# Patient Record
Sex: FEMALE | Race: WHITE | NOT HISPANIC OR LATINO
[De-identification: names, ages, dates, MRNs, and addresses within clinical notes are randomized per-mention and may not be internally consistent; named-entity substitution may affect disease eponyms.]

---

## 2022-06-06 PROBLEM — Z00.00 ENCOUNTER FOR PREVENTIVE HEALTH EXAMINATION: Status: ACTIVE | Noted: 2022-06-06

## 2022-06-07 ENCOUNTER — APPOINTMENT (OUTPATIENT)
Dept: ORTHOPEDIC SURGERY | Facility: CLINIC | Age: 57
End: 2022-06-07
Payer: COMMERCIAL

## 2022-06-07 VITALS — HEIGHT: 64 IN | WEIGHT: 155 LBS | BODY MASS INDEX: 26.46 KG/M2

## 2022-06-07 DIAGNOSIS — Z78.9 OTHER SPECIFIED HEALTH STATUS: ICD-10-CM

## 2022-06-07 DIAGNOSIS — M70.61 TROCHANTERIC BURSITIS, RIGHT HIP: ICD-10-CM

## 2022-06-07 DIAGNOSIS — M16.11 UNILATERAL PRIMARY OSTEOARTHRITIS, RIGHT HIP: ICD-10-CM

## 2022-06-07 DIAGNOSIS — I10 ESSENTIAL (PRIMARY) HYPERTENSION: ICD-10-CM

## 2022-06-07 DIAGNOSIS — Z87.442 PERSONAL HISTORY OF URINARY CALCULI: ICD-10-CM

## 2022-06-07 PROCEDURE — 99203 OFFICE O/P NEW LOW 30 MIN: CPT

## 2022-06-07 RX ORDER — HYDROCHLOROTHIAZIDE 25 MG/1
25 TABLET ORAL
Refills: 0 | Status: ACTIVE | COMMUNITY

## 2022-06-07 RX ORDER — METHYLPREDNISOLONE 4 MG/1
4 TABLET ORAL
Qty: 1 | Refills: 1 | Status: ACTIVE | COMMUNITY
Start: 2022-06-07 | End: 1900-01-01

## 2022-06-07 RX ORDER — AMLODIPINE BESYLATE 2.5 MG/1
2.5 TABLET ORAL
Refills: 0 | Status: ACTIVE | COMMUNITY

## 2022-06-07 RX ORDER — AMLODIPINE BESYLATE 5 MG/1
5 TABLET ORAL
Refills: 0 | Status: ACTIVE | COMMUNITY

## 2022-06-07 NOTE — ASSESSMENT
[FreeTextEntry1] : Patient was given Rx for PT at this time.\par \par A Medrol dose Rafat and refill was prescribed today.  Patient understands that while taking the Medrol, they shouldn't be taking any other anti-inflammatories.  They may take nsaids in between the two packs.  Pt understands and all questions were answered.\par \par f/u 6 weeks

## 2022-06-07 NOTE — HISTORY OF PRESENT ILLNESS
[de-identified] : Patient has reported 70% pain improvement since going to  [facility]. Still taking advil prn. Say Dr. Barragan 10 years ago, diagnosed with questionable hip bursitis, slight arthritis and hip deformity. Was given meloxicam lost 35 pounds and pain improved. Reports that this pain is different than this pain. Doppler at  was negative. CRP active .5 WBC 6.34 ESR 32. No history of back issues reported. has numbness in right lower back not legs.\par

## 2022-06-07 NOTE — PHYSICAL EXAM
[NL (120)] : flexion 120 degrees [NL (45)] : internal rotation 45 degrees [5___] : adduction 5[unfilled]/5 [Right] : right hip with pelvis [AP] : anteroposterior [Lateral] : lateral [] : no palpable masses [FreeTextEntry8] : mostly gt [FreeTextEntry9] : mild djd

## 2022-06-22 ENCOUNTER — NON-APPOINTMENT (OUTPATIENT)
Age: 57
End: 2022-06-22

## 2022-07-11 ENCOUNTER — APPOINTMENT (OUTPATIENT)
Dept: ORTHOPEDIC SURGERY | Facility: CLINIC | Age: 57
End: 2022-07-11

## 2024-09-06 ENCOUNTER — APPOINTMENT (OUTPATIENT)
Dept: ORTHOPEDIC SURGERY | Facility: CLINIC | Age: 59
End: 2024-09-06
Payer: COMMERCIAL

## 2024-09-06 DIAGNOSIS — M75.111 INCOMPLETE ROTATOR CUFF TEAR OR RUPTURE OF RIGHT SHOULDER, NOT SPECIFIED AS TRAUMATIC: ICD-10-CM

## 2024-09-06 PROCEDURE — 73030 X-RAY EXAM OF SHOULDER: CPT | Mod: RT

## 2024-09-06 PROCEDURE — 99213 OFFICE O/P EST LOW 20 MIN: CPT

## 2024-09-06 NOTE — HISTORY OF PRESENT ILLNESS
[de-identified] : Date of initial evaluation: 09/06/2024 Patient age: 59 year Body part causing symptoms: Right shoulder  Location of the pain: anterior Associated "arm heaviness"  Pain score today: 6/10 Duration of symptoms: 6 weeks  History of injury: fell onto her right arm Activities that worsen the pain: exercise, reaching behind, OH movements  Radicular symptoms: none  Medications attempted for this problem: Ibuprofen  PT for this problem: none  Injections for this problem: none  Previous surgery on this body part: none Prior imaging: none  Occupation:

## 2024-09-06 NOTE — DISCUSSION/SUMMARY
[de-identified] : (Impression) -right shoulder rotator cuff partial tear   (Plan) -The diagnosis was explained in detail. The potential non-surgical and surgical treatments were reviewed. The relative risks and benefits of each option were considered relative to the patient age, activity level, medical history, symptom severity and previously attempted treatments. -The patient was advised to consult with their primary medical provider prior to initiation of any new medications to reduce the risk of adverse effects specific to their long-term home medications and medical history. The risk of gastrointestinal irritation and kidney injury specific to long-term NSAID use was discussed. -Physical therapy recommended for stretching and strengthening. -Cortisone injection is deferred at this time. -Over the counter NSAID for pain and inflammation, take as needed. -MRI is deferred at this time. -Follow up in 6 to 8 weeks. If symptoms persist consider CSI.   (MDM) Problem Complexity -Moderate: acute, complicated injury   Risk -Low: over the counter medication   Visit Type -Established

## 2024-09-06 NOTE — IMAGING
[de-identified] : (Exam: Shoulder)   Laterality is right   Patient is in no acute distress, alert and oriented Sensation is grossly intact to light touch in the hand Motor function is 5/5 in the hand Capillary refill is less than 2 seconds in the fingers Lymphadenopathy is not present Peripheral edema is not present   Skin is intact Swelling is not present Atrophy is not present Scapular winging is not present Deformity of the AC joint is not present Deformity of the biceps is not present   Bicipital groove tenderness is present AC joint tenderness is not present Scapulothoracic tenderness is not present   Active forward elevation is 150 Passive forward elevation is 170 External rotation at the side is 60 Internal rotation behind the back is to the level of T12   Forward elevation strength is 4/5 External rotation strength at the side is 4/5 Internal rotation strength at the side is 5/5 Deltoid strength of anterior, posterior and lateral heads is 5/5   Resendiz test is normal OBriens test is abnormal Empty can test is abnormal Speeds test is abnormal Cross body adduction test is normal Belly press test is normal Apprehension and relocation is normal Sulcus sign is normal

## 2024-09-25 ENCOUNTER — APPOINTMENT (OUTPATIENT)
Dept: ORTHOPEDIC SURGERY | Facility: CLINIC | Age: 59
End: 2024-09-25

## 2024-09-25 DIAGNOSIS — S62.654A NONDISPLACED FX OF MID PHALANX OF RT RING FINGER INITIAL ENC. FOR CLOSED FX: ICD-10-CM

## 2024-09-25 PROCEDURE — 99212 OFFICE O/P EST SF 10 MIN: CPT

## 2024-09-25 PROCEDURE — 99202 OFFICE O/P NEW SF 15 MIN: CPT

## 2024-09-25 NOTE — IMAGING
[de-identified] : Right upper extremity  Dorsal blocking splint removed from small and ring finger Appearance; there is edema and ecchymosis at the level of the ring and small finger DIP joint no lacerations The patient is exquisitely tender to palpation at the distal phalanx and DIP joints of the ring and small finger She is able to actively flex and extend the DIP although tender -no evidence of FDP or FDS injury No sensory changes over the ulnar or radial digital nerves of the ring or small finger Brisk capillary refill at the distal phalanxes  The remainder of the right upper extremity wrist and hand exam is within normal limits and PIN and ulnar nerves are all intact motor and sensory  3 views of the right hand was independently reviewed obtained from [facility] showing  fracture of the ring finger distal phalanx visualized on 2 views at the level of the DIP joint

## 2024-09-25 NOTE — ASSESSMENT
[FreeTextEntry1] : 59-year-old female status fall earlier today with a right ring finger distal middle phalanx fracture non-displaced  Imaging reviewed with the patient as well as expected healing trajectory over the next 4 to 6 weeks  Dorsal blocking splint was removed and the ring and small finger were kayli taped allowing for motion at the PIP joint 1 inch Coban was provided to the patient so that she can remove and apply the kayli splint Herself after showers every day  Encourage gentle range of motion  Rest ice elevate right upper extremity  Follow-up in 4 weeks for reevaluation

## 2024-10-14 ENCOUNTER — APPOINTMENT (OUTPATIENT)
Dept: ORTHOPEDIC SURGERY | Facility: CLINIC | Age: 59
End: 2024-10-14

## 2024-10-31 ENCOUNTER — APPOINTMENT (OUTPATIENT)
Dept: ORTHOPEDIC SURGERY | Facility: CLINIC | Age: 59
End: 2024-10-31
Payer: COMMERCIAL

## 2024-10-31 DIAGNOSIS — S62.655D NONDISPLACED FX  MID PHALANX OF LT RING FINGER SUBSQ ENC. FX W/ROUTINE HEALING: ICD-10-CM

## 2024-10-31 DIAGNOSIS — S62.657D NONDISPLACED FX MID PHALANX  LT LITTLE FINGER SUBSQ ENC FX W/ROUTINE HEALING: ICD-10-CM

## 2024-10-31 PROCEDURE — 73130 X-RAY EXAM OF HAND: CPT | Mod: LT

## 2024-10-31 PROCEDURE — 99213 OFFICE O/P EST LOW 20 MIN: CPT

## 2024-10-31 PROCEDURE — 99203 OFFICE O/P NEW LOW 30 MIN: CPT

## 2024-11-25 ENCOUNTER — APPOINTMENT (OUTPATIENT)
Dept: ORTHOPEDIC SURGERY | Facility: CLINIC | Age: 59
End: 2024-11-25
Payer: COMMERCIAL

## 2024-11-25 DIAGNOSIS — M75.111 INCOMPLETE ROTATOR CUFF TEAR OR RUPTURE OF RIGHT SHOULDER, NOT SPECIFIED AS TRAUMATIC: ICD-10-CM

## 2024-11-25 PROCEDURE — 99213 OFFICE O/P EST LOW 20 MIN: CPT | Mod: 25

## 2024-11-25 PROCEDURE — 20610 DRAIN/INJ JOINT/BURSA W/O US: CPT | Mod: RT

## 2024-12-12 ENCOUNTER — APPOINTMENT (OUTPATIENT)
Dept: ORTHOPEDIC SURGERY | Facility: CLINIC | Age: 59
End: 2024-12-12

## 2025-01-27 ENCOUNTER — APPOINTMENT (OUTPATIENT)
Dept: ORTHOPEDIC SURGERY | Facility: CLINIC | Age: 60
End: 2025-01-27